# Patient Record
Sex: MALE | Race: WHITE | NOT HISPANIC OR LATINO | URBAN - METROPOLITAN AREA
[De-identification: names, ages, dates, MRNs, and addresses within clinical notes are randomized per-mention and may not be internally consistent; named-entity substitution may affect disease eponyms.]

---

## 2023-06-09 ENCOUNTER — EMERGENCY (EMERGENCY)
Facility: HOSPITAL | Age: 73
LOS: 1 days | Discharge: HOME | End: 2023-06-09
Attending: EMERGENCY MEDICINE | Admitting: EMERGENCY MEDICINE
Payer: SELF-PAY

## 2023-06-09 VITALS
TEMPERATURE: 98 F | RESPIRATION RATE: 17 BRPM | OXYGEN SATURATION: 96 % | DIASTOLIC BLOOD PRESSURE: 62 MMHG | SYSTOLIC BLOOD PRESSURE: 154 MMHG

## 2023-06-09 VITALS
WEIGHT: 169.98 LBS | DIASTOLIC BLOOD PRESSURE: 69 MMHG | SYSTOLIC BLOOD PRESSURE: 166 MMHG | RESPIRATION RATE: 16 BRPM | HEART RATE: 60 BPM | HEIGHT: 66 IN | OXYGEN SATURATION: 96 % | TEMPERATURE: 98 F

## 2023-06-09 PROCEDURE — 99284 EMERGENCY DEPT VISIT MOD MDM: CPT

## 2023-06-09 PROCEDURE — 99283 EMERGENCY DEPT VISIT LOW MDM: CPT

## 2023-06-09 PROCEDURE — 97161 PT EVAL LOW COMPLEX 20 MIN: CPT

## 2023-06-09 PROCEDURE — 73562 X-RAY EXAM OF KNEE 3: CPT

## 2023-06-09 PROCEDURE — 73562 X-RAY EXAM OF KNEE 3: CPT | Mod: 26,RT

## 2023-06-09 NOTE — ED ADULT NURSE NOTE - NSFALLRISKINTERV_ED_ALL_ED

## 2023-06-09 NOTE — ED PROVIDER NOTE - MUSCULOSKELETAL, MLM
R knee: +mild ttp with moderate degenerative changes, no joint instability no tenderness/swelling or redness.  Full range of motion, toes warm & mobile, distal pulses sensation intact. NVI, Gait is normal, left knee with moderate degenerative changes noted

## 2023-06-09 NOTE — PHYSICAL THERAPY INITIAL EVALUATION ADULT - ADDITIONAL COMMENTS
Pt resides in duplex alone. Pt denies PARKER, states 13 stairs +HR to second floor of duplex. Pt reports was independent prior to admission. Pt friend Mauricio Vaz present stating did not know where to go as pt has knee pain and lack of money/insurance, and has R knee pain for last 2 months.

## 2023-06-09 NOTE — ED PROVIDER NOTE - CARE PROVIDER_API CALL
Paulie Alegria.  Orthopaedic Surgery  [address], [address]  [place] [address]  Phone: [phone]  Fax: [phone]  Follow Up Time: 1-3 Days

## 2023-06-09 NOTE — ED PROVIDER NOTE - PATIENT PORTAL LINK FT
You can access the FollowMyHealth Patient Portal offered by Health system by registering at the following website: [email or web address]. By joining [facility]’s FollowMyHealth portal, you will also be able to view your health information using other applications (apps) compatible with our system.

## 2023-06-09 NOTE — ED PROVIDER NOTE - NSFOLLOWUPCLINICS_GEN_ALL_ED_FT
[facility] Orthopedic Surgery  Orthopedic Surgery  [address], 3rd & 4th [place] [address]  Phone: [phone]  Fax:   Follow Up Time: 1-3 Days

## 2023-06-09 NOTE — ED PROVIDER NOTE - PROGRESS NOTE DETAILS
Pt also seen by PT in ED and cleared for discharge. Pt seen by social work in ED and cleared, she will have medicaid specialist reach out to pt to help him with insurance. Pt noted to be ambulating independently in ED. Will d/c home and f/u orthopedic clinic. Advised to rest, cane for support, nsaids prn pain, f/u ortho. pt declined ace/knee immobilizer.

## 2023-06-09 NOTE — SOCIAL WORK PROGRESS NOTE - NSSWPROGRESSNOTE_GEN_ALL_CORE
DEENA called by Norma, patient relations this afternoon.  Norma states that patient a friend came to hospital  asking for help with insurance.  Pt and friend were directed to the ED for treatment.  DEENA met patient and friend Shiva Vaz  at bedside.  Friend states that patient lives alone in a rented room with 13 steps.  He reports that patient can not afford his rent as he only get 750 monthly from  and states patient is unable to walk up and down stairs.  Friend has tried to get patient medicare part B (he only has part A) but was told patient has to wait until November to register.  Friend states patient has a bad knee and is concerned about him.  Pt does report that he feel about 1 month ago on his knee.  However, pt walked into and he took the train to his friends home.  MD will do x ray and DEENA requested PTE.  DEENA referred patient to Torri, medicaid specialist and she will reach out to patients friend to assist if possible.  DEENA to follow.

## 2023-06-09 NOTE — ED PROVIDER NOTE - OBJECTIVE STATEMENT
72-year-old male with history of psoriasis and arthritis brought in by friend presents with complaint of right knee pain status post fall 2 months ago.  Patient states that he slipped and fell on his right knee 2 months ago and has had pain since.  States that he has been taking Tylenol for pain without relief.  Denies other injuries or symptoms.

## 2023-06-09 NOTE — ED ADULT NURSE NOTE - OBJECTIVE STATEMENT
pt comes to ed c/o rigth knee pain s/p trip and fall 2 months ago. pt denies all other complaints. no head injury, no neck or head pain, no loc. Pt ambulated in ED w/ steady gait.

## 2023-06-09 NOTE — PHYSICAL THERAPY INITIAL EVALUATION ADULT - PERTINENT HX OF CURRENT PROBLEM, REHAB EVAL
as per chart - 72-year-old male with history of psoriasis and arthritis brought in by friend presents with complaint of right knee pain status post fall 2 months ago.  Patient states that he slipped and fell on his right knee 2 months ago and has had pain since.  States that he has been taking Tylenol for pain without relief.  Denies other injuries or symptoms.

## 2023-06-09 NOTE — ED PROVIDER NOTE - CLINICAL SUMMARY MEDICAL DECISION MAKING FREE TEXT BOX
72-year-old male with history of psoriasis and arthritis states he fell 2 months ago injuring right knee.  Brought by friend for evaluation of right knee pain for the past 2 months.  Patient denies other injury or symptom.  Has no primary care doctor has not seen a doctor for years.  Physical exam vital signs stable afebrile no distress.  Extremity exam there is moderate degenerative change to both knees.  There is no joint instability no tenderness swelling or redness.  Full range of motion pulses sensation intact.  Gait is normal.  Remainder of exam unremarkable.  Impression is right knee pain rule out fracture versus degenerative disease.  Plan is x-ray Ace wrap versus knee immobilizer NSAIDs and Ortho follow-up.

## 2023-06-09 NOTE — PHYSICAL THERAPY INITIAL EVALUATION ADULT - NSACTIVITYREC_GEN_A_PT
Pt edu/instructed on step-to stair negotiation simulation with use of HR. No skilled PT needs as pt is independent in bed mobility, transfers, amb without AD. Pt edu/instructed on step-to stair negotiation simulation with use of HR. Pt reporting 3/10 pain to R knee with amb, 0/10 pain with rest. Pt cleared from PT services. No skilled PT needs as pt is independent in bed mobility, transfers, amb without AD. Pt edu/instructed on step-to stair negotiation simulation with use of HR. Pt reporting 3/10 pain to R knee with amb, 0/10 pain with rest. Pt discharged from PT services.

## 2023-06-09 NOTE — ED PROVIDER NOTE - NS ED ATTENDING STATEMENT MOD
This was a shared visit with the THELMA. I reviewed and verified the documentation and independently performed the documented:

## 2023-06-09 NOTE — ED PROVIDER NOTE - NSFOLLOWUPINSTRUCTIONS_ED_ALL_ED_FT
Follow-up with orthopedist for reevaluation, ongoing care and treatment.  Rest, weightbearing as tolerated, use cane for support.  Take over-the-counter Tylenol or Motrin as directed for pain.  If having worsening of symptoms or other related symptoms, return to the ER immediately.    Knee Pain    WHAT YOU NEED TO KNOW:    What do I need to know about knee pain? Knee pain may start suddenly, or it may be a long-term problem. You may have pain on the side, front, or back of your knee. You may have knee stiffness and swelling. You may hear popping sounds or feel like your knee is giving way or locking up as you walk. You may feel pain when you sit, stand, walk, or climb up and down stairs.    What increases my risk for knee pain?    Obesity    A strain or tear in ligaments or tendons    A leg fracture or knee dislocation    Overuse of your knee    Osteoarthritis, rheumatoid arthritis, or gout    An infection, tumor, or cyst in your knee    Shoes that are not supportive, or training on a hard surface    Sports that involve jumping or pivoting on your knee  How is the cause of knee pain diagnosed? Your healthcare provider will examine your knee and ask about your symptoms. Tell your provider when the pain started and what you were doing at the time. Describe the pain, such as sharp, throbbing, or achy. Tell your provider about any knee injury or surgery you had. You may need any of the following:    MRI, CT, or ultrasound pictures may show an injury, fracture, or tumor.    Blood tests may be used to check the level of inflammation in your blood. The tests may also show signs of infection.    Arthroscopy is a procedure to look inside your knee joint with an arthroscope. An arthroscope is a flexible tube with a light and camera on the end. A knee arthroscopy is usually done to check for disease or damage inside your knee. These problems may be fixed during the procedure.  How is knee pain treated? Treatment will depend on the cause of your pain. You may need any of the following:    NSAIDs help decrease swelling and pain or fever. This medicine is available with or without a doctor's order. NSAIDs can cause stomach bleeding or kidney problems in certain people. If you take blood thinner medicine, always ask your healthcare provider if NSAIDs are safe for you. Always read the medicine label and follow directions.    Acetaminophen decreases pain and fever. It is available without a doctor's order. Ask how much to take and how often to take it. Follow directions. Read the labels of all other medicines you are using to see if they also contain acetaminophen, or ask your doctor or pharmacist. Acetaminophen can cause liver damage if not taken correctly.    Prescription pain medicine may be given. Ask your healthcare provider how to take this medicine safely. Some prescription pain medicines contain acetaminophen. Do not take other medicines that contain acetaminophen without talking to your healthcare provider. Too much acetaminophen may cause liver damage. Prescription pain medicine may cause constipation. Ask your healthcare provider how to prevent or treat constipation.    Steroid injections may be given into your knee. Steroids reduce inflammation and pain.    Surgery may be used for some injuries, such as to repair a torn ACL.  What can I do to manage my symptoms?    Rest your knee so it can heal. Limit activities that increase your pain. Do low-impact exercises, such as walking or swimming.    Apply ice to help reduce swelling and pain. Use an ice pack, or put crushed ice in a plastic bag. Cover it with a towel before you apply it to your knee. Apply ice for 15 to 20 minutes every hour, or as directed.    Apply compression to help reduce swelling. Use a brace or bandage only as directed.    Elevate your knee to help decrease pain and swelling. Elevate your knee while you are sitting or lying down. Prop your leg on pillows to keep your knee above the level of your heart.    Prevent your knee from moving as directed. Your healthcare provider may put on a cast or splint. You may need to wear a leg brace to stabilize your knee. A leg brace can be adjusted to increase your range of motion as your knee heals.  Hinged Knee Braces   What can I do to prevent knee pain?    Maintain a healthy weight. Extra weight increases your risk for knee pain. Ask your healthcare provider how much you should weigh. He or she can help you create a safe weight loss plan if you need to lose weight.    Exercise or train properly. Use the correct equipment for sports. Wear shoes that provide good support. Check your posture often as you exercise, play sports, or train for an event. This can help prevent stress and strain on your knees. Rest between sessions so you do not overwork your knees.  When should I seek immediate care?    Your pain is worse, even after treatment.    You cannot bend or straighten your leg completely.    The swelling around your knee does not go down even with treatment.    Your knee is painful and hot to the touch.  When should I contact my healthcare provider?    You have questions or concerns about your condition or care.    CARE AGREEMENT:    You have the right to help plan your care. Learn about your health condition and how it may be treated. Discuss treatment options with your healthcare providers to decide what care you want to receive. You always have the right to refuse treatment.

## 2023-06-16 PROBLEM — Z00.00 ENCOUNTER FOR PREVENTIVE HEALTH EXAMINATION: Status: ACTIVE | Noted: 2023-06-16

## 2023-07-11 ENCOUNTER — APPOINTMENT (OUTPATIENT)
Dept: ORTHOPEDIC SURGERY | Facility: CLINIC | Age: 73
End: 2023-07-11